# Patient Record
Sex: FEMALE
[De-identification: names, ages, dates, MRNs, and addresses within clinical notes are randomized per-mention and may not be internally consistent; named-entity substitution may affect disease eponyms.]

---

## 2023-01-23 ENCOUNTER — HOSPITAL ENCOUNTER (EMERGENCY)
Dept: HOSPITAL 97 - ER | Age: 58
Discharge: HOME | End: 2023-01-23
Payer: COMMERCIAL

## 2023-01-23 VITALS — DIASTOLIC BLOOD PRESSURE: 79 MMHG | SYSTOLIC BLOOD PRESSURE: 137 MMHG | OXYGEN SATURATION: 99 % | TEMPERATURE: 97.5 F

## 2023-01-23 DIAGNOSIS — S93.401A: Primary | ICD-10-CM

## 2023-01-23 PROCEDURE — 99282 EMERGENCY DEPT VISIT SF MDM: CPT

## 2023-01-23 NOTE — RAD REPORT
EXAM DESCRIPTION:  RAD - Ankle Right 3 View - 1/23/2023 12:13 pm

 

CLINICAL HISTORY:  PAIN

 

COMPARISON:  No comparisons

 

FINDINGS:  Moderate soft tissue swelling affects the ankle. Small calcaneal spurs are present. No acu
te fracture or dislocation.

## 2023-01-23 NOTE — EDPHYS
Physician Documentation                                                                           

 [facility]                                                                 

Name: Natalia Godinez                                                                              

Age: 57 yrs                                                                                       

Sex: Female                                                                                       

: 1965                                                                                   

MRN: [id number]                                                                                   

Arrival Date: 2023                                                                          

Time: 10:39                                                                                       

Account#: [id number]                                                                            

Bed 12                                                                                            

Private MD:                                                                                       

ED Physician Donnie Fraga                                                                         

HPI:                                                                                              

                                                                                             

11:10 This 57 yrs old  Female presents to ER via Wheelchair with complaints of Ankle  ms3 

      Injury.                                                                                     

11:10 57-year-old female with past medical history of hypertension, rheumatoid arthritis      ms3 

      presents for 6/10, throbbing, right ankle pain that began after twisting her ankle when     

      getting out of her truck yesterday. Patient states pain is worse with walking. Patient      

      denies alleviating factors..                                                                

                                                                                                  

OB/GYN:                                                                                           

10:57 LMP N/A - Hysterectomy                                                                  ss  

                                                                                                  

Historical:                                                                                       

- Allergies:                                                                                      

10:57 valsartan;                                                                              ss  

10:57 Lipitor;                                                                                ss  

- Home Meds:                                                                                      

10:57 Vitamin D [Active]; CPAP at night [Active]; rosuvastatin 20 mg oral cpSP 1 cap once     ss  

      daily [Active]; metformin 500 mg Oral Tb24 1 tab once daily [Active]; amlodipine 5 mg       

      tab 1 tab once daily [Active]; spironolactone 50 mg Oral tab 1 tab once daily [Active];     

      metoprolol succinate 100 mg oral Tb24 1 tab once daily [Active]; hydrochlorothiazide 25     

      mg Oral tab 1 tab once daily [Active]; sulfasalazine 500 mg Oral tab 1 tab twice a day      

      [Active]; losartan 50 mg oral tab 1 tab once daily [Active]; Methotrexate                   

      (Anti-Rheumatic) 2.5 mg Oral tab 1 tab once wkly [Active];                                  

- PMHx:                                                                                           

10:57 Hypertensive disorder; pre diabetic;                                                    ss  

- PSHx:                                                                                           

10:57 L shoulder repair;  section; hysterectomy;                                      ss  

                                                                                                  

- Immunization history:: Client reports receiving the 2nd dose of the Covid vaccine.              

- Social history:: Smoking status: Patient denies any tobacco usage or history of.                

                                                                                                  

                                                                                                  

ROS:                                                                                              

11:10 Constitutional: Negative for fever, and chills. Neck: Negative for injury, pain, and    ms3 

      swelling, Cardiovascular: Negative for chest pain, and palpitations. Respiratory:           

      Negative for shortness of breath, cough, wheezing, and pleuritic chest pain,                

      Abdomen/GI: Negative for abdominal pain, nausea, vomiting, diarrhea, and constipation.      

11:10 MS/extremity: Positive for injury or acute deformity, tenderness.                           

11:10 All other systems are negative.                                                             

                                                                                                  

Exam:                                                                                             

11:10 Constitutional:  This is a well developed, well nourished patient who is awake, alert,  ms3 

      and in no acute distress. Head/Face:  Normocephalic, atraumatic. Chest/axilla:  Normal      

      chest wall appearance and motion.  Nontender with no deformity.   Cardiovascular:           

      Regular rate and rhythm with a normal S1 and S2.  No gallops, murmurs, or rubs.  Normal     

      PMI, no JVD.  No pulse deficits. Respiratory:  Lungs have equal breath sounds               

      bilaterally, clear to auscultation and percussion.  No rales, rhonchi or wheezes noted.     

       No increased work of breathing, no retractions or nasal flaring. Abdomen/GI:  Soft,        

      non-tender, with normal bowel sounds.  No distension or tympany.  No guarding or            

      rebound.  No evidence of tenderness throughout.                                             

11:10 Skin:  Warm, dry with normal turgor.  Normal color with no rashes, no lesions, and no       

      evidence of cellulitis.                                                                     

11:10 Musculoskeletal/extremity: Extremities: noted in the Right ankle pain: pain, swelling,      

      tenderness.                                                                                 

                                                                                                  

Vital Signs:                                                                                      

10:56  / 79; Pulse 94; Resp 16; Temp 97.5(TE); Pulse Ox 99% on R/A; Weight 111.13 kg;   ss  

      Height 5 ft. 3 in. (160.02 cm); Pain 6/10;                                                  

10:56 Body Mass Index 43.40 (111.13 kg, 160.02 cm)                                              

                                                                                                  

MDM:                                                                                              

11:07 Patient medically screened.                                                             ms3 

11:10 Differential diagnosis: fracture, sprain, Contusion.                                    ms3 

19:24 Data reviewed: vital signs, nurses notes, radiologic studies, plain films, and as a     ms3 

      result, I will discharge patient. Consideration of Admission/Observation Escalation of      

      care including admission/observation considered. No emergent medical condition              

      necessitating admission found at this time. Independent interpretation of the following     

      test(s) in the Emergency Department X-Ray: My interpretation is My preliminary viewing      

      of x-ray did not reveal fracture. Historians other than the Patient: Spouse/Significant     

      Other: Patient's . Counseling: I had a detailed discussion with the patient          

      and/or guardian regarding: the historical points, exam findings, and any diagnostic         

      results supporting the discharge/admit diagnosis, radiology results, the need for           

      outpatient follow up, to return to the emergency department if symptoms worsen or           

      persist or if there are any questions or concerns that arise at home. ED course:            

      Discussed x-ray final report with patient and her . They understand and agree        

      with plan. Patient to follow-up with her orthopedist in 2 to 3 days. Discussed with the     

      patient and her  if pain is persistent in 1 week patient may need additional         

      imaging. Patient and her  understand agree with plan. Return precautions             

      discussed to include worsening symptoms, or any other concerns..                            

                                                                                                  

                                                                                             

11:08 Order name: Ankle Right 3 View XRAY; Complete Time: 12:52                               ms3 

                                                                                                  

Administered Medications:                                                                         

No medications were administered                                                                  

                                                                                                  

                                                                                                  

Disposition Summary:                                                                              

23 12:56                                                                                    

Discharge Ordered                                                                                 

      Location: Home                                                                          ms3 

      Condition: Stable                                                                       ms3 

      Diagnosis                                                                                   

        - Pain in right ankle and joints of right foot                                        ms3 

        - Sprain of unspecified ligament of right ankle                                       ms3 

      Followup:                                                                               ms3 

        - With: Clem Rooney MD                                                              

        - When: 2 - 3 days                                                                         

        - Reason: Recheck today's complaints                                                       

      Followup:                                                                               ms3 

        - With: Private Physician                                                                  

        - When: 2 - 3 days                                                                         

        - Reason: Recheck today's complaints                                                       

      Discharge Instructions:                                                                     

        - Discharge Summary Sheet                                                             ms3 

        - Musculoskeletal Pain                                                                ms3 

      Forms:                                                                                      

        - Medication Reconciliation Form                                                      ms3 

        - Thank You Letter                                                                    ms3 

        - Antibiotic Education                                                                ms3 

        - Prescription Opioid Use                                                             ms3 

Signatures:                                                                                       

Dispatcher [employer]                           Bertha Hernandez RN RN ss Sims, Marcus, DO                        DO   ms3                                                  

                                                                                                  

Corrections: (The following items were deleted from the chart)                                    

: 10:57 Allergies: No Known Allergies; [facility]  

: 10:57 Allergies: No Known Allergies; [facility]  

: 10:57 Home Meds: None; [facility]  

: 10:57 PMHx: None; [facility]  

: 10:57 PMHx: CPAP; [facility]  

: 10:57 PMHx: Vitamin D; [facility]  

11: 10:57 PSHx: None; [facility]  

11: 10:57 PSHx:  section; [facility]  

                                                                                                  

**************************************************************************************************

## 2023-01-23 NOTE — ER
Nurse's Notes                                                                                     

 [facility]                                                                 

Name: Natalia Godinez                                                                              

Age: 57 yrs                                                                                       

Sex: Female                                                                                       

: 1965                                                                                   

MRN: [id number]                                                                                   

Arrival Date: 2023                                                                          

Time: 10:39                                                                                       

Account#: [id number]                                                                            

Bed 12                                                                                            

Private MD:                                                                                       

Diagnosis: Pain in right ankle and joints of right foot;Sprain of unspecified ligament of right   

  ankle                                                                                           

                                                                                                  

Presentation:                                                                                     

                                                                                             

10:56 Chief complaint: Patient states: R ankle pain after rolling ankle while getting out of    

      truck yesterday. Coronavirus screen: Client denies travel out of the [place] in the last       

      14 days. Ebola Screen: Patient denies exposure to infectious person. Patient denies         

      travel to an Ebola-affected area in the 21 days before illness onset. Initial Sepsis        

      Screen: Does the patient meet any 2 criteria? No. Patient's initial sepsis screen is        

      negative. Does the patient have a suspected source of infection? No. Patient's initial      

      sepsis screen is negative. Risk Assessment: Do you want to hurt yourself or someone         

      else? Patient reports no desire to harm self or others. Onset of symptoms was 2023.                                                                                   

10:56 Method Of Arrival: Wheelchair                                                           ss  

10:56 Acuity: TABATHA 4                                                                           ss  

                                                                                                  

OB/GYN:                                                                                           

10:57 LMP N/A - Hysterectomy                                                                  ss  

                                                                                                  

Historical:                                                                                       

- Allergies:                                                                                      

10:57 valsartan;                                                                              ss  

10:57 Lipitor;                                                                                ss  

- Home Meds:                                                                                      

10:57 Vitamin D [Active]; CPAP at night [Active]; rosuvastatin 20 mg oral cpSP 1 cap once     ss  

      daily [Active]; metformin 500 mg Oral Tb24 1 tab once daily [Active]; amlodipine 5 mg       

      tab 1 tab once daily [Active]; spironolactone 50 mg Oral tab 1 tab once daily [Active];     

      metoprolol succinate 100 mg oral Tb24 1 tab once daily [Active]; hydrochlorothiazide 25     

      mg Oral tab 1 tab once daily [Active]; sulfasalazine 500 mg Oral tab 1 tab twice a day      

      [Active]; losartan 50 mg oral tab 1 tab once daily [Active]; Methotrexate                   

      (Anti-Rheumatic) 2.5 mg Oral tab 1 tab once wkly [Active];                                  

- PMHx:                                                                                           

10:57 Hypertensive disorder; pre diabetic;                                                    ss  

- PSHx:                                                                                           

10:57 L shoulder repair;  section; hysterectomy;                                      ss  

                                                                                                  

- Immunization history:: Client reports receiving the 2nd dose of the Covid vaccine.              

- Social history:: Smoking status: Patient denies any tobacco usage or history of.                

                                                                                                  

                                                                                                  

Assessment:                                                                                       

11:17 General: Appears in no apparent distress. comfortable, Behavior is calm, cooperative.   ss  

      Pain: Complains of pain in R ankle L shoulder Pain currently is 6 out of 10 on a pain       

      scale. Quality of pain is described as aching, tender, Pain began Is continuous.            

                                                                                                  

Vital Signs:                                                                                      

10:56  / 79; Pulse 94; Resp 16; Temp 97.5(TE); Pulse Ox 99% on R/A; Weight 111.13 kg;   ss  

      Height 5 ft. 3 in. (160.02 cm); Pain 6/10;                                                  

10:56 Body Mass Index 43.40 (111.13 kg, 160.02 cm)                                              

                                                                                                  

ED Course:                                                                                        

10:39 Patient arrived in ED.                                                                  mr  

10:57 Triage completed.                                                                       ss  

10:57 Arm band placed on right wrist.                                                         ss  

10:58 Donnie Fraga DO is Attending Physician.                                                ms3 

11:04 Yanni Padgett FNP-C is PHCP.                                                          snw 

11:16 Bertha Coats, RN is Primary Nurse.                                                    ss  

12:15 Ankle Right 3 View XRAY In Process Unspecified.                                         EDMS

12:55 Clem Rooney MD is Referral Physician.                                            ms3 

12:55 Referral Physician role handed off by Clem Rooney MD                             ms3 

                                                                                                  

Administered Medications:                                                                         

No medications were administered                                                                  

                                                                                                  

                                                                                                  

Outcome:                                                                                          

12:56 Discharge ordered by MD.                                                                ms3 

13:13 Patient left the ED.                                                                    iw  

                                                                                                  

Signatures:                                                                                       

Dispatcher [employer]                           EDMS                                                 

Yanni Padgett FNP-C                   FNP-Csn                                                  

KhrisCaron                                                                                    

Kristin Vail RN                     RN                                                      

Bertha Coats, DINORA                      RN                                                      

Donnie Fraga DO                        DO   ms3                                                  

                                                                                                  

Corrections: (The following items were deleted from the chart)                                    

11:02 10:57 Allergies: No Known Allergies; [facility]  

11: 10:57 Allergies: No Known Allergies; [facility]  

11: 10:57 Home Meds: None; [facility]  

11: 10:57 PMHx: None; [facility]  

11: 10:57 PMHx: CPAP; [facility]  

11: 10:57 PMHx: Vitamin D; [facility]  

11: 10:57 PSHx: None; ss                                                                    ss  

11:02 10:57 PSHx:  section; ss                                                        ss  

                                                                                                  

**************************************************************************************************

## 2023-01-23 NOTE — XMS REPORT
Continuity of Care Document

                           Created on:2023



Patient:TANK GODINEZ

Sex:Female

:1965

External Reference #:[id number]





Demographics







                          Address                   [address] [place] [address]

 

                          Home Phone                [phone]

 

                          Work Phone                2022

 

                          Mobile Phone              2022

 

                          Email Address             [email or web address]

 

                          Preferred Language        es

 

                          Marital Status            Unknown

 

                          Amish Affiliation     Unknown

 

                          Race                      Unknown

 

                          Additional Race(s)         or 



                                                    Unavailable



                                                    Unavailable

 

                          Ethnic Group               or 









Author







                          Organization              [facility]

t

 

                          Address                   [address] [place] [address] [address]



                                                    [place] [address]

 

                          Phone                     [phone]









Support







                Name            Relationship    Address         Phone

 

                Cee Godinez Spouse          [address]     [phone]

3



                                                [place] [address] 

 

                NO, CONTACT     Unavailable     .               Unavailable



                                                .               

 

                TANK GODINEZ Unavailable     [address] [phone]



                                                [place] [address] 

 

                CEE GODINEZ SPOU            [address] [facility]   [phone]



                                                [place] [address] 

 

                MAGY GODINEZEN SO              [address] [facility] [address]   [phone]



                                                [place] [address] 









Care Team Providers







                    Name                Role                Phone

 

                    Jovan Haddad     Primary Care Physician [phone]

 

                    MARK MANN     Attending Clinician Unavailable

 

                    Bui_Q               Attending Clinician Unavailable

 

                    BRADEN MARQUEZ Attending Clinician Unavailable

 

                    [facility]   Attending Clinician Unavailable

 

                    Yared Mariee MD Attending Clinician [phone]

 

                    YARED MARIEE Attending Clinician Unavailable

 

                    Doctor Unassigned, No Name Attending Clinician Unavailable

 

                    Jovan Haddad        Attending Clinician Unavailable

 

                    Shani Wills Attending Clinician Unavailable

 

                    Boo_Q               Admitting Clinician Unavailable

 

                    Jovan Haddad        Admitting Clinician Unavailable

 

                    Aditya Rodriguez Admitting Clinician Unavailable









Payers







           Payer Name Policy Type Policy Number Effective Date Expiration Date S

mary

 

           [employer]            [id number] 2022            



           HMO/PLUS                         00:00:00              

 

           [place]            [id number] 2022 



           ADVANTAGE (HMO)                       00:00:00   00:00:00   







Problems







       Condition Condition Condition Status Onset  Resolution Last   Treating Co

mments 

Source



       Name   Details Category        Date   Date   Treatment Clinician        



                                                 Date                 

 

       Immunodefi Immunodefi Problem Active 2022                             V

illage



       ciency ciency               2-24                               Family



       disorder Disorder               00:00:                             Practi

c



                                   00                                 e

 

       Nontraumat Nontraumat Disease Active 2022                      Last   U

T



       ic     ic                                           [facility]



       complete complete               00:00:                      t & Plan: 



       tear of tear of               00                          Formattin 



       rotator rotator                                           g of this 



       cuff, left cuff, left                                           note   



                                                               might be 



                                                               different 



                                                               from the 



                                                               original. 



                                                               The    



                                                               sutures 



                                                               were   



                                                               removed. 



                                                               Incision 



                                                               was    



                                                               clean, 



                                                               dry and 



                                                               intact. 



                                                               Benzoin 



                                                               was    



                                                               applied 



                                                               and    



                                                               Steri-Str 



                                                               ips were 



                                                               applied 



                                                               over the 



                                                               incision. 



                                                               . Ms. Godinez 



                                                               was    



                                                               educated 



                                                               on the 



                                                               signs of 



                                                               infection 



                                                               including 



                                                               increased 



                                                               pain,  



                                                               erythema, 



                                                               tendernes 



                                                               s, and 



                                                               drainage. 



                                                               Ms. Godinez 



                                                               verbalize 



                                                               d      



                                                               understan 



                                                               ding and 



                                                               will   



                                                               notify us 



                                                               immediate 



                                                               ly if any 



                                                               of the 



                                                               symptoms 



                                                               are    



                                                               noticed 

 

       Dyslipidem Dyslipidem Problem Active                              V

illage



       ia     ia                   6-30                               Family



                                   00:00:                             Practic



                                   00                                 e

 

       Obstructiv Obstructiv Problem Active                              V

illage



       e sleep e Sleep               4-04                               Family



       apnea  Apnea                00:00:                             Practic



       syndrome Syndrome               00                                 e

 

       Vitamin D Vitamin D Problem Active                              Shawna

dayami



       deficiency Deficiency               1-25                               Fa

moses



                                   00:00:                             Practic



                                   00                                 e

 

       Diabetes Diabetes Problem Active                              [place]

ge



       mellitus Mellitus               1-                               Family



                                   00:00:                             Practic



                                   00                                 e

 

       Hypertensi Hypertensi Problem Active                              V

illage



       ve     ve                   1-21                               Family



       disorder Disorder               00:00:                             Practi

c



                                   00                                 e

 

       Carotid Carotid Problem Active                              [place]



       artery Artery               -                               Family



       stenosis Stenosis               00:00:                             Practi

c



                                   00                                 e

 

       Rheumatoid Rheumatoid Problem Active                              V

illage



       arthritis Arthritis               -21                               Fami

ly



                                   00:00:                             Practic



                                   00                                 e







Allergies, Adverse Reactions, Alerts







       Allergy Allergy Status Severity Reaction(s) Onset  Inactive Treating Comm

ents 

Source



       Name   Type                        Date   Date   Clinician        

 

       Valsarta Allergy Active        Swelling 2022                      [facility]



       n      to                          08                        [facility]



              substanc                      00:00:                      



              e                           00                          

 

       atorvast DA     Active U                                   [facility]



       atin                                                       [facility]



                                          00:00:                      d



                                          00                          [facility]



                                                                      [facility]

 

       atorvast DA     Active U      SWELLING                       [facility]



       atin                                                       [facility]



                                          00:00:                      d



                                          00                          Medical



                                                                      Center

 

       Atorvast Allergy Active        Swelling                       [facility]



       atin   to                                                  Health



              substanc                      00:00:                      



              e                           00                          

 

       Lipitor Allergy Active Moderate Swelling                             Vill

age



              to                                                      Family



              substanc                                                  Practic



              e                                                       e

 

       NO KNOWN Drug   Active                                           [facility]



       ALLERGIE Class                                                   ity of



       S                                                              [facility]







Social History







           Social Habit Start Date Stop Date  Quantity   Comments   Source

 

           Exposure to 2022 Not sure              [facility]



           SARS-CoV-2 (event) 00:00:00   08:34:00                         

 

           Alcohol intake 2022 Lifetime              [facility]



                      00:00:00   00:00:00   non-drinker            



                                            (finding)             

 

           Tobacco use and 2022 Smokeless tobacco            [facility]



           exposure   00:00:00   00:00:00   non-user              

 

           Sex Assigned At 1965                       [facility]



           Birth      00:00:00   00:00:00                         









                Smoking Status  Start Date      Stop Date       Source

 

                Tobacco smoking consumption                                 [facility]

 

                Never smoked tobacco                                 [facility]







Medications







       Ordered Filled Start  Stop   Current Ordering Indication Dosage Frequency

 Signature

                    Comments            Components          Source



     Medication Medication Date Date Medication? Clinician                (SIG) 

          



     Name Name                                                   

 

     cephalexin      2022      Yes       [id number]           BEGIN THE      

     [facility]



     (Keflex)      2-           DAY AFTER           [facility]

h



     500 MG      00:00:                               SX, 1           



     capsule      00                                 CAPSULE PO           



                                                  EVERY 6           



                                                  HOURS           



                                                  UNTIL           



                                                  COMPLETE           

 

     inFLIXimab      2022      Yes                      Infuse           [facility]



     (Remicade)                                     into a           Health



     100 MG      13:26:                               venous           



     injection      38                                 catheter.           

 

     rosuvastati      2022      Yes                      rosuvastat           

[facility]



     n (Crestor)      08                               in 20 mg           [facility]

th



     20 MG      13:14:                               tablet           



     tablet      30                                 TAKE ONE           



                                                  (1)            



                                                  TABLET(S)           



                                                  BY MOUTH           



                                                  AT             



                                                  BEDTIME.           

 

     metFORMIN      2022      Yes                      metformin           [facility]



     (Glucophage      -08                               500 mg           Health



     ) 500 MG      13:14:                               tablet           



     tablet      30                                 TAKE ONE           



                                                  (1)            



                                                  TABLET(S)           



                                                  BY MOUTH           



                                                  ONCE A           



                                                  DAY.           

 

     amLODIPine      2022      Yes                      amlodipine           U

T



     (Norvasc) 5      -08                               5 mg           Health



     MG tablet      13:14:                               tablet           



               30                                 TAKE ONE           



                                                  (1)            



                                                  TABLET(S)           



                                                  BY MOUTH           



                                                  AT             



                                                  BEDTIME.           

 

     sulfaSALAzi      2022      Yes                      sulfasalaz           

[facility]



     ne        08                               ine 500 mg           Health



     (Azulfidine      13:14:                               tablet           



     ) 500 MG      30                                 TAKE ONE           



     tablet                                         (1)            



                                                  TABLET(S)           



                                                  BY MOUTH           



                                                  ONCE A DAY           

 

     metoprolol      2022      Yes                      metoprolol           U

T



     succinate                                     succinate           Healt

h



     XL        13:14:                                mg           



     (Toprol-XL)      30                                 tablet,ext           



     100 MG 24                                         ended           



     hr tablet                                         release 24           



                                                  hr TAKE           



                                                  ONE (1)           



                                                  TABLET(S)           



                                                  BY MOUTH           



                                                  TWICE A           



                                                  DAY.           

 

     hydroCHLORO      2022      Yes                      hydrochlor           

[facility]



     thiazide                                     othiazide           Health



     (HYDRODiuri      13:14:                               25 mg           



     l) 25 MG      30                                 tablet           



     tablet                                         TAKE ONE           



                                                  (1)            



                                                  TABLET(S)           



                                                  BY MOUTH           



                                                  ONCE A           



                                                  DAY.           

 

     spironolact      2022      Yes                      spironolac           

[facility]



     one       08                               tone 50 mg           Health



     (Aldactone)      13:14:                               tablet           



     50 MG      30                                 TAKE ONE           



     tablet                                         (1)            



                                                  TABLET(S)           



                                                  BY MOUTH           



                                                  ONCE A           



                                                  DAY.           

 

     gabapentin      2022      Yes                      gabapentin           U

T



     (Neurontin)                                     300 mg           Health



     300 MG      13:14:                               capsule           



     capsule      30                                 TAKE ONE           



                                                  (1)            



                                                  CAPSULE BY           



                                                  MOUTH AT           



                                                  BEDTIME.           

 

     losartan      2022      Yes                      losartan           [facility]



     (Cozaar) 50                                     50 mg           Health



     MG tablet      13:14:                               tablet           



               30                                 TAKE ONE           



                                                  (1)            



                                                  TABLET(S)           



                                                  BY MOUTH           



                                                  ONCE A           



                                                  DAY.           

 

     cyclobenzap      2022      Yes                      cyclobenza           

[facility]



     rine      08                               prine 5 mg           Health



     (Flexeril)      13:14:                               tablet           



     5 MG tablet      30                                                

 

     methotrexat            Yes                      INJECT 0.5           

[facility]



     e 50 MG/2ML      7-11                               MLS UNDER           Hea

lth



     injection      00:00:                               THE SKIN           



               00                                 ONCE A           



                                                  WEEK.           

 

     amlodipine amlodipine           No                       amlodipine        

   [facility]



     5 mg tablet 5 mg tablet                                    5 mg           F

amily



     TAKE ONE TAKE ONE                                    tablet           Pract

ic



     (1)  (1)                                     TAKE ONE           e



     TABLET(S) TABLET(S)                                    (1)            



     BY MOUTH BY MOUTH                                    TABLET(S)           



     ONCE A DAY ONCE A DAY                                    BY MOUTH          

 



     AT BEDTIME. AT BEDTIME.                                    ONCE A DAY      

     



                                                  AT             



                                                  BEDTIME.           

 

     cholecalcif cholecalcif           No                       cholecalci      

     [facility]           Family



     (vitamin (vitamin                                    (vitamin           Pra

ctic



     D3) 1,250 D3) 1,250                                    D3) 1,250           

e



     mcg (50,000 mcg (50,000                                    mcg            



     unit) unit)                                    (50,000           



     capsule capsule                                    unit)           



     TAKE ONE TAKE ONE                                    capsule           



     (1)  (1)                                     TAKE ONE           



     CAPSULE(S) CAPSULE(S)                                    (1)            



     BY MOUTH BY MOUTH                                    CAPSULE(S)           



     ONCE A ONCE A                                    BY MOUTH           



     WEEK. WEEK.                                    ONCE A           



                                                  WEEK.           

 

     hydrochloro hydrochloro           No                       hydrochlor      

     [facility]



     thiazide 25 thiazide 25                                    othiazide       

    Family



     mg tablet mg tablet                                    25 mg           Prac

tic



     TAKE ONE TAKE ONE                                    tablet           e



     (1)  (1)                                     TAKE ONE           



     TABLET(S) TABLET(S)                                    (1)            



     BY MOUTH BY MOUTH                                    TABLET(S)           



     ONCE A DAY. ONCE A DAY.                                    BY MOUTH        

   



                                                  ONCE A           



                                                  DAY.           

 

     losartan 50 losartan 50           No                       losartan        

   [facility]



     mg tablet mg tablet                                    50 mg           [facility]

ly



     TAKE ONE TAKE ONE                                    tablet           Pract

ic



     (1)  (1)                                     TAKE ONE           e



     TABLET(S) TABLET(S)                                    (1)            



     BY MOUTH BY MOUTH                                    TABLET(S)           



     ONCE A DAY. ONCE A DAY.                                    BY MOUTH        

   



                                                  ONCE A           



                                                  DAY.           

 

     metformin metformin                                  metformin           

[facility]



     500 mg 500 mg                                    500 mg           Family



     tablet TAKE tablet TAKE                                    tablet          

 Practic



     ONE (1) ONE (1)                                    TAKE ONE           e



     TABLET(S) TABLET(S)                                    (1)            



     BY MOUTH BY MOUTH                                    TABLET(S)           



     ONCE A DAY. ONCE A DAY.                                    BY MOUTH        

   



                                                  ONCE A           



                                                  DAY.           

 

     metoprolol metoprolol                                  metoprolol        

   [facility]



     succinate succinate                                    succinate           

Family



      mg  mg                                     mg           

Pract[facility]



     tablet,exte tablet,exte                                    tablet,ext      

     e



     nded nded                                    ended           



     release 24 release 24                                    release 24        

   



     hr TAKE ONE hr TAKE ONE                                    hr TAKE         

  



     (1)  (1)                                     ONE (1)           



     TABLET(S) TABLET(S)                                    TABLET(S)           



     BY MOUTH BY MOUTH                                    BY MOUTH           



     TWICE A TWICE A                                    TWICE A           



     DAY. DAY.                                    DAY.           

 

     Remicade Remicade           No                       Remicade           [facility]

[facility]



                                                                 Family



                                                                 Practic



                                                                 e

 

     rosuvastati rosuvastati           No                       rosuvastat      

     [facility]



     n 20 mg n 20 mg                                    in 20 mg           Famil

y



     tablet TAKE tablet TAKE                                    tablet          

 Pract[facility]



     ONE (1) ONE (1)                                    TAKE ONE           e



     TABLET(S) TABLET(S)                                    (1)            



     BY MOUTH AT BY MOUTH AT                                    TABLET(S)       

    



     BEDTIME. BEDTIME.                                    BY MOUTH           



                                                  AT             



                                                  BEDTIME.           

 

     spironolact spironolact           No                       spironolac      

     [facility]



     one 50 mg one 50 mg                                    tone 50 mg          

 Family



     tablet TAKE tablet TAKE                                    tablet          

 Pract[facility]



     ONE (1) ONE (1)                                    TAKE ONE           e



     TABLET(S) TABLET(S)                                    (1)            



     BY MOUTH BY MOUTH                                    TABLET(S)           



     ONCE A DAY. ONCE A DAY.                                    BY MOUTH        

   



                                                  ONCE A           



                                                  DAY.           

 

     sulfasalazi sulfasalazi           No                       sulfasalaz      

     [facility]



     ne 500 mg ne 500 mg                                    ine 500 mg          

 Family



     tablet TAKE tablet TAKE                                    tablet          

 Pract[facility]



     ONE (1) ONE (1)                                    TAKE ONE           e



     TABLET(S) TABLET(S)                                    (1)            



     BY MOUTH BY MOUTH                                    TABLET(S)           



     TWICE A TWICE A                                    BY MOUTH           



     DAY. DAY.                                    TWICE A           



                                                  DAY.           

 

     amlodipine amlodipine           No                       amlodipine        

   [facility]



     5 mg tablet 5 mg tablet                                    5 mg           F

amily



     TAKE ONE TAKE ONE                                    tablet           Pract

ic



     (1)  (1)                                     TAKE ONE           e



     TABLET(S) TABLET(S)                                    (1)            



     BY MOUTH AT BY MOUTH AT                                    TABLET(S)       

    



     BEDTIME. BEDTIME.                                    BY MOUTH           



                                                  AT             



                                                  BEDTIME.           

 

     cholecalcif cholecalcif           No                       cholecalci      

     [facility]



     mars marsshayne oliveros           Family



     (vitamin (vitamin                                    (vitamin           Pra

ctic



     D3) 1,250 D3) 1,250                                    D3) 1,250           

e



     mcg (50,000 mcg (50,000                                    mcg            



     unit) unit)                                    (50,000           



     capsule capsule                                    unit)           



     TAKE ONE TAKE ONE                                    capsule           



     (1)  (1)                                     TAKE ONE           



     CAPSULE(S) CAPSULE(S)                                    (1)            



     BY MOUTH BY MOUTH                                    CAPSULE(S)           



     ONCE A ONCE A                                    BY MOUTH           



     WEEK. WEEK.                                    ONCE A           



                                                  WEEK.           

 

     cyclobenzap cyclobenzap           No                       cyclobenza      

     [facility]



     rine 5 mg rine 5 mg                                    prine 5 mg          

 Family



     tablet tablet                                    tablet           Practic



                                                                 e

 

     Folbee Plus Folbee Plus           No                       Folbee          

 [facility]



     5 mg tablet 5 mg tablet                                    Plus 5 mg       

    Family



     TAKE ONE TAKE ONE                                    tablet           Pract

ic



     (1)  (1)                                     TAKE ONE           e



     TABLET(S) TABLET(S)                                    (1)            



     BY MOUTH BY MOUTH                                    TABLET(S)           



     ONCE A DAY. ONCE A DAY.                                    BY MOUTH        

   



                                                  ONCE A           



                                                  DAY.           

 

     hydrochloro hydrochloro           No                       hydrochlor      

     [facility]



     thiazide 25 thiazide 25                                    othiazide       

    Family



     mg tablet mg tablet                                    25 mg           Prac

tic



     TAKE ONE TAKE ONE                                    tablet           e



     (1)  (1)                                     TAKE ONE           



     TABLET(S) TABLET(S)                                    (1)            



     BY MOUTH BY MOUTH                                    TABLET(S)           



     ONCE A DAY. ONCE A DAY.                                    BY MOUTH        

   



                                                  ONCE A           



                                                  DAY.           

 

     losartan 50 losartan 50           No                       losartan        

   [facility]



     mg tablet mg tablet                                    50 mg           Fami

ly



     TAKE ONE TAKE ONE                                    tablet           Pract

ic



     (1)  (1)                                     TAKE ONE           e



     TABLET(S) TABLET(S)                                    (1)            



     BY MOUTH BY MOUTH                                    TABLET(S)           



     ONCE A DAY. ONCE A DAY.                                    BY MOUTH        

   



                                                  ONCE A           



                                                  DAY.           

 

     metformin metformin           No                       metformin           

[facility]



     500 mg 500 mg                                    500 mg           Family



     tablet TAKE tablet TAKE                                    tablet          

 Pract[facility]



     ONE (1) ONE (1)                                    TAKE ONE           e



     TABLET(S) TABLET(S)                                    (1)            



     BY MOUTH BY MOUTH                                    TABLET(S)           



     ONCE A DAY. ONCE A DAY.                                    BY MOUTH        

   



                                                  ONCE A           



                                                  DAY.           

 

     methotrexat methotrexat                                  methotrexa      

     [facility]



     e sodium 25 e sodium 25                                    te sodium       

    Family



     mg/mL mg/mL                                    25 mg/mL           [facility]



     injection injection                                    injection           

e



     solution solution                                    solution           



     INJECT 0.5 INJECT 0.5                                    INJECT 0.5        

   



     MLS UNDER MLS UNDER                                    MLS UNDER           



     THE SKIN THE SKIN                                    THE SKIN           



     ONCE A ONCE A                                    ONCE A           



     WEEK. WEEK.                                    WEEK.           

 

     metoprolol metoprolol                                  metoprolol        

   [facility]



     succinate succinate                                    succinate           

Family



      mg  mg                                     mg           

[facility]



     tablet,exte tablet,exte                                    tablet,ext      

     e



     nded nded                                    ended           



     release 24 release 24                                    release 24        

   



     hr TAKE ONE hr TAKE ONE                                    hr TAKE         

  



     (1)  (1)                                     ONE (1)           



     TABLET(S) TABLET(S)                                    TABLET(S)           



     BY MOUTH BY MOUTH                                    BY MOUTH           



     TWICE A TWICE A                                    TWICE A           



     DAY. DAY.                                    DAY.           

 

     Remicade Remicade           No                       Remicade           [facility]

[facility]



                                                                 Family



                                                                 Pract[facility]



                                                                 e

 

     [facility]



     n 20 mg n 20 mg                                    in 20 mg           Famil

y



     tablet TAKE tablet TAKE                                    tablet          

 Practic



     ONE (1) ONE (1)                                    TAKE ONE           e



     TABLET(S) TABLET(S)                                    (1)            



     BY MOUTH AT BY MOUTH AT                                    TABLET(S)       

    



     BEDTIME. BEDTIME.                                    BY MOUTH           



                                                  AT             



                                                  BEDTIME.           

 

     spironolact spironolact                                  spironolac      

     [facility]



     one 50 mg one 50 mg                                    tone 50 mg          

 Family



     tablet TAKE tablet TAKE                                    tablet          

 Pract[facility]



     ONE (1) ONE (1)                                    TAKE ONE           e



     TABLET(S) TABLET(S)                                    (1)            



     BY MOUTH BY MOUTH                                    TABLET(S)           



     ONCE A DAY. ONCE A DAY.                                    BY MOUTH        

   



                                                  ONCE A           



                                                  DAY.           

 

     sulfasalazi sulfasalazi                                  sulfasalaz      

     [facility]



     ne 500 mg ne 500 mg                                    ine 500 mg          

 Family



     tablet TAKE tablet TAKE                                    tablet          

 Pract[facility]



     ONE (1) ONE (1)                                    TAKE ONE           e



     TABLET(S) TABLET(S)                                    (1)            



     BY MOUTH BY MOUTH                                    TABLET(S)           



     ONCE A DAY ONCE A DAY                                    BY MOUTH          

 



                                                  ONCE A DAY           

 

     Sutab Sutab           No                       Sutab           [facility]



     [phone]                                    [phone]      

     Family



     -0.225 gram -0.225 gram                                    8-0.225         

  [facility]



     tablet TAKE tablet TAKE                                    gram           e



     AS DIRECTED AS DIRECTED                                    tablet          

 



     BY   BY                                      TAKE AS           



     PHYSICIAN PHYSICIAN                                    DIRECTED           



                                                  BY             



                                                  PHYSICIAN           

 

     amlodipine amlodipine           No                       amlodipine        

   [facility]



     5 mg tablet 5 mg tablet                                    5 mg           F

amily



     TAKE ONE TAKE ONE                                    tablet           Pract

ic



     (1)  (1)                                     TAKE ONE           e



     TABLET(S) TABLET(S)                                    (1)            



     BY MOUTH AT BY MOUTH AT                                    TABLET(S)       

    



     BEDTIME. BEDTIME.                                    BY MOUTH           



                                                  AT             



                                                  BEDTIME.           

 

     benzonatate benzonatate           No             1capsul TID  benzonatat   

        [facility]



     200 mg 200 mg                          e(s)      e 200 mg           Family



     capsule capsule                                    capsule           Practi

c



     Take 1 Take 1                                    Take 1           e



     capsule 3 capsule 3                                    capsule 3           



     times a day times a day                                    times a         

  



     by oral by oral                                    day by           



     route as route as                                    oral route           



     needed for needed for                                    as needed         

  



     10 days. 10 days.                                    for 10           



                                                  days.           

 

     cholecalcif cholecalcif           No                       cholecalci      

     [facility]



     mars mars                                    ferol           Family



     (vitamin (vitamin                                    (vitamin           Pra

ctic



     D3) 1,250 D3) 1,250                                    D3) 1,250           

e



     mcg (50,000 mcg (50,000                                    mcg            



     unit) unit)                                    (50,000           



     capsule capsule                                    unit)           



     TAKE ONE TAKE ONE                                    capsule           



     (1)  (1)                                     TAKE ONE           



     CAPSULE(S) CAPSULE(S)                                    (1)            



     BY MOUTH BY MOUTH                                    CAPSULE(S)           



     ONCE A ONCE A                                    BY MOUTH           



     WEEK. WEEK.                                    ONCE A           



                                                  WEEK.           

 

     cyclobenzap cyclobenzap           No                       cyclobenza      

     [facility]



     rine 5 mg rine 5 mg                                    prine 5 mg          

 Family



     tablet TAKE tablet TAKE                                    tablet          

 Pract[facility]



     ONE (1) ONE (1)                                    TAKE ONE           e



     TABLET BY TABLET BY                                    (1) TABLET          

 



     MOUTH 3 MOUTH 3                                    BY MOUTH 3           



     TIMES PER TIMES PER                                    TIMES PER           



     DAY AS DAY AS                                    DAY AS           



     NEEDED. NEEDED.                                    NEEDED.           

 

     Folbee Plus Folbee Plus           No                       Folbee          

 [facility]



     5 mg tablet 5 mg tablet                                    Plus 5 mg       

    Family



     TAKE ONE TAKE ONE                                    tablet           Pract

ic



     (1)  (1)                                     TAKE ONE           e



     TABLET(S) TABLET(S)                                    (1)            



     BY MOUTH BY MOUTH                                    TABLET(S)           



     ONCE A DAY. ONCE A DAY.                                    BY MOUTH        

   



                                                  ONCE A           



                                                  DAY.           

 

     hydrochloro hydrochloro           No                       hydrochlor      

     [facility]



     thiazide 25 thiazide 25                                    othiazide       

    Family



     mg tablet mg tablet                                    25 mg           Prac

tic



     TAKE ONE TAKE ONE                                    tablet           e



     (1)  (1)                                     TAKE ONE           



     TABLET(S) TABLET(S)                                    (1)            



     BY MOUTH BY MOUTH                                    TABLET(S)           



     ONCE A DAY. ONCE A DAY.                                    BY MOUTH        

   



                                                  ONCE A           



                                                  DAY.           

 

     losartan 50 losartan 50           No                       losartan        

   [facility]



     mg tablet mg tablet                                    50 mg           Fami

ly



     TAKE ONE TAKE ONE                                    tablet           Pract

ic



     (1)  (1)                                     TAKE ONE           e



     TABLET(S) TABLET(S)                                    (1)            



     BY MOUTH BY MOUTH                                    TABLET(S)           



     ONCE A DAY. ONCE A DAY.                                    BY MOUTH        

   



                                                  ONCE A           



                                                  DAY.           

 

     metformin metformin                                  metformin           

[facility]



     500 mg 500 mg                                    500 mg           Family



     tablet TAKE tablet TAKE                                    tablet          

 [facility]



     ONE (1) ONE (1)                                    TAKE ONE           e



     TABLET(S) TABLET(S)                                    (1)            



     BY MOUTH BY MOUTH                                    TABLET(S)           



     ONCE A DAY. ONCE A DAY.                                    BY MOUTH        

   



                                                  ONCE A           



                                                  DAY.           

 

     methotrexat methotrexat           No                       methotrexa      

     [facility]



     e sodium e sodium                                    te sodium           [facility]

[facility]



     (PF) 25 (PF) 25                                    (PF) 25           Practi

c



     mg/mL mg/mL                                    mg/mL           e



     injection injection                                    injection           



     solution solution                                    solution           



     INJECT 0.5 INJECT 0.5                                    INJECT 0.5        

   



     ML(S) ML(S)                                    ML(S)           



     SUBCUTANEOU SUBCUTANEOU                                    SUBCUTANEO      

     



     S ONCE A S ONCE A                                    US ONCE A           



     WEEK. WEEK.                                    WEEK.           

 

     [facility]



     e sodium 25 e sodium 25                                    te sodium       

    Family



     mg/mL mg/mL                                    25 mg/mL           [facility]



     injection injection                                    injection           

e



     solution solution                                    solution           



     INJECT 0.5 INJECT 0.5                                    INJECT 0.5        

   



     MLS UNDER MLS UNDER                                    MLS UNDER           



     THE SKIN THE SKIN                                    THE SKIN           



     ONCE A ONCE A                                    ONCE A           



     WEEK. WEEK.                                    WEEK.           

 

     metoprolol metoprolol                                  metoprolol        

   [facility]



     succinate succinate                                    succinate           

Family



      mg  mg                                     mg           

[facility]



     tablet,exte tablet,exte                                    tablet,ext      

     e



     nded nded                                    ended           



     release 24 release 24                                    release 24        

   



     hr TAKE ONE hr TAKE ONE                                    hr TAKE         

  



     (1)  (1)                                     ONE (1)           



     TABLET(S) TABLET(S)                                    TABLET(S)           



     BY MOUTH BY MOUTH                                    BY MOUTH           



     TWICE A TWICE A                                    TWICE A           



     DAY. DAY.                                    DAY.           

 

     Remicade Remicade           No                       Remicade           [facility]

dayami



                                                                 Family



                                                                 [facility]



                                                                 e

 

     rosuvastati rosuvastati           No                       rosuvastat      

     [facility]



     n 20 mg n 20 mg                                    in 20 mg           Famil

y



     tablet TAKE tablet TAKE                                    tablet          

 [facility]



     ONE (1) ONE (1)                                    TAKE ONE           e



     TABLET(S) TABLET(S)                                    (1)            



     BY MOUTH AT BY MOUTH AT                                    TABLET(S)       

    



     BEDTIME. BEDTIME.                                    BY MOUTH           



                                                  AT             



                                                  BEDTIME.           

 

     spironolact spironolact           No                       spironolac      

     [facility]



     one 50 mg one 50 mg                                    tone 50 mg          

 Family



     tablet TAKE tablet TAKE                                    tablet          

 [facility]



     ONE (1) ONE (1)                                    TAKE ONE           e



     TABLET(S) TABLET(S)                                    (1)            



     BY MOUTH BY MOUTH                                    TABLET(S)           



     ONCE A DAY. ONCE A DAY.                                    BY MOUTH        

   



                                                  ONCE A           



                                                  DAY.           

 

     sulfasalazi sulfasalazi           No                       sulfasalaz      

     [facility]



     ne 500 mg ne 500 mg                                    ine 500 mg          

 Family



     tablet TAKE tablet TAKE                                    tablet          

 [facility]



     ONE (1) ONE (1)                                    TAKE ONE           e



     TABLET(S) TABLET(S)                                    (1)            



     BY MOUTH BY MOUTH                                    TABLET(S)           



     ONCE A DAY. ONCE A DAY.                                    BY MOUTH        

   



                                                  ONCE A           



                                                  DAY.           

 

     Sutab Sutab           No                       Sutab           [facility]



     [phone]                                    [phone]      

     Family



     -0.225 gram -0.225 gram                                    8-0.225         

  [facility]



     tablet TAKE tablet TAKE                                    gram           e



     AS DIRECTED AS DIRECTED                                    tablet          

 



     BY   BY                                      TAKE AS           



     PHYSICIAN PHYSICIAN                                    DIRECTED           



                                                  BY             



                                                  PHYSICIAN           

 

     Tamiflu 75 Tamiflu 75           No             1capsul BID  Tamiflu 75     

      [facility]



     mg capsule mg capsule                          e(s)      mg capsule        

   Family



     Take 1 Take 1                                    Take 1           [facility]



     capsule capsule                                    capsule           e



     twice a day twice a day                                    twice a         

  



     by oral by oral                                    day by           



     route for 5 route for 5                                    oral route      

     



     days. days.                                    for 5           



                                                  days.           

 

     tramadol 50 tramadol 50           No                       tramadol        

   [facility]



     mg tablet mg tablet                                    50 mg           Fami

ly



     TAKE ONE TAKE ONE                                    tablet           Pract

ic



     (1)  (1)                                     TAKE ONE           e



     TABLET(S) TABLET(S)                                    (1)            



     BY MOUTH BY MOUTH                                    TABLET(S)           



     TWICE A DAY TWICE A DAY                                    BY MOUTH        

   



     AS NEEDED AS NEEDED                                    TWICE A           



     FOR PAIN. FOR PAIN.                                    DAY AS           



                                                  NEEDED FOR           



                                                  PAIN.           

 

     amlodipine amlodipine           No                       amlodipine        

   [facility]



     5 mg tablet 5 mg tablet                                    5 mg           F

amily



     TAKE ONE TAKE ONE                                    tablet           Pract

ic



     (1)  (1)                                     TAKE ONE           e



     TABLET(S) TABLET(S)                                    (1)            



     BY MOUTH AT BY MOUTH AT                                    TABLET(S)       

    



     BEDTIME. BEDTIME.                                    BY MOUTH           



                                                  AT             



                                                  BEDTIME.           

 

     cholecalcif cholecalcif           No                       cholecalci      

     [facility]



     masr mars                                    ferol           Family



     (vitamin (vitamin                                    (vitamin           Pra

ctic



     D3) 1,250 D3) 1,250                                    D3) 1,250           

e



     mcg (50,000 mcg (50,000                                    mcg            



     unit) unit)                                    (50,000           



     capsule capsule                                    unit)           



     TAKE ONE TAKE ONE                                    capsule           



     (1)  (1)                                     TAKE ONE           



     CAPSULE(S) CAPSULE(S)                                    (1)            



     BY MOUTH BY MOUTH                                    CAPSULE(S)           



     ONCE A ONCE A                                    BY MOUTH           



     WEEK. WEEK.                                    ONCE A           



                                                  WEEK.           

 

     cyclobenzap cyclobenzap           No                       cyclobenza      

     [facility]



     rine 5 mg rine 5 mg                                    prine 5 mg          

 Family



     tablet TAKE tablet TAKE                                    tablet          

 [facility]



     ONE (1) ONE (1)                                    TAKE ONE           e



     TABLET BY TABLET BY                                    (1) TABLET          

 



     MOUTH 3 MOUTH 3                                    BY MOUTH 3           



     TIMES PER TIMES PER                                    TIMES PER           



     DAY AS DAY AS                                    DAY AS           



     NEEDED. NEEDED.                                    NEEDED.           

 

     Folbee Plus Folbee Plus           No                       Folbee          

 [facility]



     5 mg tablet 5 mg tablet                                    Plus 5 mg       

    Family



     TAKE ONE TAKE ONE                                    tablet           Pract

ic



     (1)  (1)                                     TAKE ONE           e



     TABLET(S) TABLET(S)                                    (1)            



     BY MOUTH BY MOUTH                                    TABLET(S)           



     ONCE A DAY. ONCE A DAY.                                    BY MOUTH        

   



                                                  ONCE A           



                                                  DAY.           

 

     hydrochloro hydrochloro           No                       hydrochlor      

     [facility]



     thiazide 25 thiazide 25                                    othiazide       

    Family



     mg tablet mg tablet                                    25 mg           Prac

tic



     TAKE ONE TAKE ONE                                    tablet           e



     (1)  (1)                                     TAKE ONE           



     TABLET(S) TABLET(S)                                    (1)            



     BY MOUTH BY MOUTH                                    TABLET(S)           



     ONCE A DAY. ONCE A DAY.                                    BY MOUTH        

   



                                                  ONCE A           



                                                  DAY.           

 

     hydrocodone hydrocodone           No                       hydrocodon      

     [facility]



     5    5                                       e 5            Family



     mg-acetamin mg-acetamin                                    mg-acetami      

     [facility]



     ophen 325 ophen 325                                    nophen 325          

 e



     mg tablet mg tablet                                    mg tablet           



     TAKE ONE TAKE ONE                                    TAKE ONE           



     (1) OR TWO (1) OR TWO                                    (1) OR TWO        

   



     (2)  (2)                                     (2)            



     TABLET(S) TABLET(S)                                    TABLET(S)           



     BY MOUTH BY MOUTH                                    BY MOUTH           



     EVERY SIX EVERY SIX                                    EVERY SIX           



     HOURS IF HOURS IF                                    HOURS IF           



     NEEDED FOR NEEDED FOR                                    NEEDED FOR        

   



     SEVERE PAIN SEVERE PAIN                                    SEVERE          

 



     FOR UP TO 7 FOR UP TO 7                                    PAIN FOR        

   



     DAYS. DAYS.                                    UP TO 7           



                                                  DAYS.           

 

     losartan 50 losartan 50           No                       losartan        

   [facility]



     mg tablet mg tablet                                    50 mg           Fami

ly



     TAKE ONE TAKE ONE                                    tablet           Pract

ic



     (1)  (1)                                     TAKE ONE           e



     TABLET(S) TABLET(S)                                    (1)            



     BY MOUTH BY MOUTH                                    TABLET(S)           



     ONCE A DAY. ONCE A DAY.                                    BY MOUTH        

   



                                                  ONCE A           



                                                  DAY.           

 

     metformin metformin           No                       metformin           

[facility]



     500 mg 500 mg                                    500 mg           Family



     tablet TAKE tablet TAKE                                    tablet          

 [facility]



     ONE (1) ONE (1)                                    TAKE ONE           e



     TABLET(S) TABLET(S)                                    (1)            



     BY MOUTH BY MOUTH                                    TABLET(S)           



     ONCE A DAY. ONCE A DAY.                                    BY MOUTH        

   



                                                  ONCE A           



                                                  DAY.           

 

     methotrexat methotrexat                                  methotrexa      

     [facility]



     e sodium e sodium                                    te sodium           Fa

moses



     (PF) 25 (PF) 25                                    (PF) 25           Pract[facility]

c



     mg/mL mg/mL                                    mg/mL           e



     injection injection                                    injection           



     solution solution                                    solution           



     INJECT 0.5 INJECT 0.5                                    INJECT 0.5        

   



     ML(S) ML(S)                                    ML(S)           



     SUBCUTANEOU SUBCUTANEOU                                    SUBCUTANEO      

     



     S ONCE A S ONCE A                                    US ONCE A           



     WEEK. WEEK.                                    WEEK.           

 

     metoprolol metoprolol                                  metoprolol        

   [facility]



     succinate succinate                                    succinate           

Family



      mg  mg                                     mg           

[facility]



     tablet,exte tablet,exte                                    tablet,ext      

     e



     nded nded                                    ended           



     release 24 release 24                                    release 24        

   



     hr TAKE ONE hr TAKE ONE                                    hr TAKE         

  



     (1)  (1)                                     ONE (1)           



     TABLET(S) TABLET(S)                                    TABLET(S)           



     BY MOUTH BY MOUTH                                    BY MOUTH           



     ONCE A DAY. ONCE A DAY.                                    ONCE A          

 



                                                  DAY.           

 

     rosuvastati rosuvastati           No             1    Q1D  rosuvastat      

     [facility]



     n 20 mg n 20 mg                                    in 20 mg           Famil

y



     tablet Take tablet Take                                    tablet          

 [facility]



     1 tablet 1 tablet                                    Take 1           e



     every day every day                                    tablet           



     by oral by oral                                    every day           



     route for route for                                    by oral           



     90 days. 90 days.                                    route for           



                                                  90 days.           

 

     spironolact spironolact                                  spironolac      

     [facility]



     one 50 mg one 50 mg                                    tone 50 mg          

 Family



     tablet TAKE tablet TAKE                                    tablet          

 [facility]



     ONE (1) ONE (1)                                    TAKE ONE           e



     TABLET(S) TABLET(S)                                    (1)            



     BY MOUTH BY MOUTH                                    TABLET(S)           



     ONCE A DAY. ONCE A DAY.                                    BY MOUTH        

   



                                                  ONCE A           



                                                  DAY.           

 

     sulfasalazi sulfasalazi                                  sulfasalaz      

     [facility]



     ne 500 mg ne 500 mg                                    ine 500 mg          

 Family



     tablet TAKE tablet TAKE                                    tablet          

 [facility]



     ONE (1) ONE (1)                                    TAKE ONE           e



     TABLET(S) TABLET(S)                                    (1)            



     BY MOUTH BY MOUTH                                    TABLET(S)           



     ONCE A DAY. ONCE A DAY.                                    BY MOUTH        

   



                                                  ONCE A           



                                                  DAY.           

 

     tramadol 50 tramadol 50                                  tramadol        

   [facility]



     mg tablet mg tablet                                    50 mg           Fami

ly



     TAKE ONE TAKE ONE                                    tablet           Pract

ic



     (1)  (1)                                     TAKE ONE           e



     TABLET(S) TABLET(S)                                    (1)            



     BY MOUTH BY MOUTH                                    TABLET(S)           



     TWICE A DAY TWICE A DAY                                    BY MOUTH        

   



     AS NEEDED AS NEEDED                                    TWICE A           



     FOR PAIN. FOR PAIN.                                    DAY AS           



                                                  NEEDED FOR           



                                                  PAIN.           







Immunizations







           Ordered Immunization Filled Immunization Date       Status     Commen

ts   Source



           Name       Name                                        

 

           Pneumococcal Pneumococcal 2022 Completed             [facility]



           conjugate PCV20, conjugate PCV20, 10:35:00                         Pr

actice



           polysaccharide [id number] polysaccharide [id number]                          

        



           conjugate, adjuvant, conjugate, adjuvant,                            

      



           PF         PF                                          

 

           Pneumococcal Pneumococcal 2022 Completed             [facility]



           conjugate PCV20, conjugate PCV20, 10:35:00                         Pr

actice



           polysaccharide [id number] polysaccharide [id number]                          

        



           conjugate, adjuvant, conjugate, adjuvant,                            

      



           PF         PF                                          

 

           Pneumococcal Pneumococcal 2022 Completed             [facility]



           conjugate PCV20, conjugate PCV20, 10:35:00                         Pr

actice



           polysaccharide [id number] polysaccharide [id number]                          

        



           conjugate, adjuvant, conjugate, adjuvant,                            

      



           PF         PF                                          

 

           zoster recombinant zoster recombinant 2022 Completed           

  [facility]



                                 10:26:00                         Practice

 

           zoster recombinant zoster recombinant 2022 Completed           

  [facility]



                                 10:26:00                         Practice

 

           zoster recombinant zoster recombinant 2022 Completed           

  [facility]



                                 10:26:00                         Practice

 

           Tdap       Tdap       2022 Completed             [facility]



                                 16:47:00                         Practice

 

           Tdap       Tdap       2022 Completed             [facility]



                                 16:47:00                         Practice

 

           Tdap       Tdap       2022 Completed             [facility]



                                 16:47:00                         Practice

 

           Tdap       Tdap       2022 Completed             [facility]



                                 16:47:00                         Practice

 

           zoster recombinant zoster recombinant 2022 Completed           

  [facility]



                                 16:44:00                         Practice

 

           zoster recombinant zoster recombinant 2022 Completed           

  [facility]



                                 16:44:00                         Practice

 

           zoster recombinant zoster recombinant 2022 Completed           

  [facility]



                                 16:44:00                         Practice

 

           zoster recombinant zoster recombinant 2022 Completed           

  [facility]



                                 16:44:00                         Practice

 

           COVID-19, mRNA, COVID-19, mRNA, 2021 Completed             [facility]

[facility]



           LNP-S, PF, 100 LNP-S, PF, 100 00:00:00                         Practi

ce



           mcg/0.5 mL dose mcg/0.5 mL dose                                  



           (Moderna)  (Moderna)                                   

 

           COVID-19, mRNA, COVID-19, mRNA, 2021 Completed             [facility]



           LNP-S, PF, 100 LNP-S, PF, 100 00:00:00                         Practi

ce



           mcg/0.5 mL dose mcg/0.5 mL dose                                  



           (Moderna)  (Moderna)                                   

 

           COVID-19, mRNA, COVID-19, mRNA, 2021 Completed             [facility]

[facility]



           LNP-S, PF, 100 LNP-S, PF, 100 00:00:00                         Practi

ce



           mcg/0.5 mL dose mcg/0.5 mL dose                                  



           (Moderna)  (Moderna)                                   

 

           COVID-19, mRNA, COVID-19, mRNA, 2021 Completed             Vill

[facility]



           LNP-S, PF, 100 LNP-S, PF, 100 00:00:00                         Practi

ce



           mcg/0.5 mL dose mcg/0.5 mL dose                                  



           (Moderna)  (Moderna)                                   

 

           COVID-19, mRNA, COVID-19, mRNA, 2021 Completed             [facility]

[facility]



           LNP-S, PF, 100 LNP-S, PF, 100 00:00:00                         Practi

ce



           mcg/0.5 mL dose mcg/0.5 mL dose                                  



           (Moderna)  (Moderna)                                   

 

           COVID-19, mRNA, COVID-19, mRNA, 2021 Completed             [facility]

[facility]



           LNP-S, PF, 100 LNP-S, PF, 100 00:00:00                         Practi

ce



           mcg/0.5 mL dose mcg/0.5 mL dose                                  



           (Moderna)  (Moderna)                                   

 

           COVID-19, mRNA, COVID-19, mRNA, 2021 Completed             [facility]

[facility]



           LNP-S, PF, 100 LNP-S, PF, 100 00:00:00                         Practi

ce



           mcg/0.5 mL dose mcg/0.5 mL dose                                  



           (Moderna)  (Moderna)                                   

 

           COVID-19, mRNA, COVID-19, mRNA, 2021 Completed             [facility]

[facility]



           LNP-S, PF, 100 LNP-S, PF, 100 00:00:00                         Practi

ce



           mcg/0.5 mL dose mcg/0.5 mL dose                                  



           (Moderna)  (Moderna)                                   







Vital Signs







             Vital Name   Observation Time Observation Value Comments     Source

 

             Height       2022 00:00:00 63 [in_i]                 [facility]



                                                                 P[facility]

 

             BMI (Body Mass 2022 00:00:00 43.2 kg/m2                [facility]



             Index)                                              Practice

 

             Body Weight  2022 00:00:00 244 [lb_av]               [facility]



                                                                 [facility]

 

             BP Diastolic 2022 00:00:00 72 mm[Hg]                 [facility]



                                                                 [facility]

 

             Height       2022 00:00:00 63 [in_i]                 [facility]



                                                                 [facility]

 

             BMI (Body Mass 2022 00:00:00 43.2 kg/m2                [facility]

[facility]



             Index)                                              Practice

 

             BP Systolic  2022 00:00:00 131 mm[Hg]                [facility]



                                                                 [facility]

 

             Body Weight  2022 00:00:00 244 [lb_av]               [facility]



                                                                 [facility]

 

             Height       2022 00:00:00 63 [in_i]                 [facility]



                                                                 [facility]

 

             BMI (Body Mass 2022 00:00:00 43.2 kg/m2                [facility]

[facility]



             Index)                                              Practice

 

             Body Weight  2022 00:00:00 244 [lb_av]               [facility]



                                                                 [facility]

 

             BP Diastolic 2022 00:00:00 76 mm[Hg]                 [facility]



                                                                 [facility]

 

             Height       2022 00:00:00 63 [in_i]                 [facility]



                                                                 [facility]

 

             BMI (Body Mass 2022 00:00:00 43.1 kg/m2                [facility]

[facility]



             Index)                                              Practice

 

             BP Systolic  2022 00:00:00 106 mm[Hg]                [facility]



                                                                 [facility]

 

             Body Weight  2022 00:00:00 243.2 [lb_av]              [facility]



                                                                 Practice

 

             BP Diastolic 2022 00:00:00 78 mm[Hg]                 [facility]



                                                                 [facility]

 

             Height       2022 00:00:00 63 [in_i]                 [facility]



                                                                 [facility]

 

             BMI (Body Mass 2022 00:00:00 43.8 kg/m2                [facility]

[facility]



             Index)                                              Practice

 

             BP Systolic  2022 00:00:00 111 mm[Hg]                [facility]



                                                                 [facility]

 

             Body Weight  2022 00:00:00 247 [lb_av]               [facility]



                                                                 [facility]

 

             BP Diastolic 2022 00:00:00 81 mm[Hg]                 [facility]



                                                                 [facility]

 

             Height       2022 00:00:00 63 [in_i]                 [facility]



                                                                 [facility]

 

             BMI (Body Mass 2022 00:00:00 43.6 kg/m2                [facility]

e Family



             Index)                                              Practice

 

             BP Systolic  2022 00:00:00 117 mm[Hg]                [facility]

 

             Body Weight  2022 00:00:00 246 [lb_av]               [facility]







Procedures







                Procedure       Date / Time     Performing Clinician Source



                                Performed                       

 

                ASSIGNMENT OF BENEFITS 2022 18:33:20 Doctor Unassigned, No

 [facility]            [facility]

 

                MAMMO, screening, 2022 00:00:00                 [facility]

moess



                digital, bilateral                                 Practice

 

                MAMMO, screening, 2022 00:00:00                 [facility]

[facility]



                digital, bilateral                                 Practice

 

                 Section                                 [facility]

 

                Partial Hysterectomy                                 [facility]







Plan of Care







             Planned Activity Planned Date Details      Comments     Source

 

             Diagnostic Test Pending 2022   HbA1c (hemoglobin             

 [facility]



                          00:00:00     A1c), blood [code              Practice



                                       = HbA1c                   



                                       (hemoglobin A1c),              



                                       blood]                    

 

             Future Appointment 2023   Jovan Haddad, [id number]              [facility]

 



                          00:00:00     [address]              Practice



                                       [address]; [address],              



                                       [place], [place]              



                                       [address]                

 

             Instructions                                        [facility]







Encounters







        Start   End     Encounter Admission Attending Care    Care    Encounter 

Source



        Date/Time Date/Time Type    Type    Clinicians Facility Department ID   

   

 

        2022         Outpatient                 [facility]     [id number]

 [place]



        07:50:33                                                 [id number] [facility]

 

        2022         Outpatient                 [facility]     [id number]

 [place]



        08:37:37                                                 [id number] [facility]

 

        2022         Outpatient                 [facility]     [id number]

 [place]



        10:17:39                                                 [id number] [facility]

 

        2022         Outpatient                 [facility]     [id number]

 [place]



        14:47:55                                                 [id number] [facility]

 

        2022         Outpatient                 [facility]     [id number]

 [place]



        13:01:07                                                 [id number] [facility]

 

        2022         Outpatient                 [facility]     [id number]

 [place]



        13:00:38                                                 [id number] [facility]

 

        2022         Outpatient                 [facility]     [id number]

 [place]



        08:58:48                                                 [id number] [facility]

 

        2022-10-05         Outpatient                 [facility]     [id number]

 [place]



        10:01:45                                                 [id number] [facility]

 

        2022-10-04         Outpatient                 [facility]     [id number]

 [place]



        13:49:47                                                 [id number] [facility]

 

        2023-02-10 2023-02-10 Outpatient         JOHNATHAN [facility]     [id number]

740 [place]



        09:15:00 09:15:00                 [facility]

 

        2022 Office          [facility] [id number]

7342 [place]



        08:45:00 08:53:22 Visit           Mark KENNY [id number]         H

[facility] [id number]         



                                                PLAZA 1 [phone]         



                                                        7               

 

        2022 Outpatient         Bui_Q   VFP     VFP     [id number]

-20 [facility]



        00:00:00 00:00:00                                         [id number]  Family



                                                                        Practic



                                                                        e

 

        2022 Outpatient         Bui_Q   VFP     VFP     [id number]

 [facility]



        00:00:00 00:00:00                                         [id number]  Family



                                                                        Practic



                                                                        e

 

        2022 Outpatient         Bui_Q   VFP     VFP     [id number]

-20 [facility]



        00:00:00 00:00:00                                         [id number]  Family



                                                                        Practic



                                                                        e

 

        2022 Outpatient         Bui_Q   VFP     VFP     [id number] [facility]



        00:00:00 00:00:00                                         [id number]  Family



                                                                        Practic



                                                                        e

 

        2022 Jovan Mandel                 VFP     [place] -    [id number]

3 [facility]



        00:00:00 00:00:00 MD Boo:                         Village         Famil

y



                        [id number]                           Medical -         Practi

c



                        [place]                          [place] -            e



                        [address]         



                        [address]                            [address]         



                        [address],                                         



                        [place], [place]                                              



                        [address]                                         



                        , Ph.                                           



                        [phone]                                         

 

        2022 Outpatient         JOHNATHAN [facility]     [id number]

008 [place]



        13:00:00 13:00:00                 [facility]

 

        2022 Outpatient         ROBERT [facility]     [id number]

[id number] [place]



        14:00:00 14:00:00                 BRADEN BYRD

h

 

        2022-12-15 2022-12-15 Outpatient         Bui_Q   VFP     VFP     [id number]

-20 [facility]



        00:00:00 00:00:00                                         [id number]  Family



                                                                        Practic



                                                                        e

 

        2022-12-15 2022-12-15 Outpatient         Bui_Q   VFP     VFP     [id number]

-20 [facility]



        00:00:00 00:00:00                                         [id number]  Family



                                                                        Practic



                                                                        e

 

        2022 Outpatient         Bui_Q   VFP     VFP     [id number]

-20 [facility]



        00:00:00 00:00:00                                         [id number]  Family



                                                                        Practic



                                                                        e

 

        2022 Lisa                  VFP     [place] -    [id number] V

illage



        00:00:00 00:00:00 [facility]

mark manuel MD:                         Medical -         Pract

ic



                        [id number]                           [place] -            e



                        [address]                          [address]         



                        [address]                           [address]                                           



                        [address],                                         



                        [place], [place]                                              



                        [address]                                         



                        , Ph.                                           



                        [phone]                                         

 

        2022 [facility]     142

[id number] [place]



        13:30:00 14:26:53                 E, BRADENKAISER Kirkland

h

 

        2022 Outpatient                 [facility]     [id number]

26 [place]



        13:30:00 13:30:00                                                 Health

 

        2022 Outpatient         [facility]     [id number]

[id number] [place]



        09:30:00 09:30:00                 E, BRADEN                         Isael

talita

 

        2022-10-31 2022-10-31 Outpatient         Bui_Q   VFP     VFP     [id number]

- [facility]



        00:00:00 00:00:00                                         [id number]  Family



                                                                        Practic



                                                                        e

 

        2022 Outpatient         Bui_Q   VFP     VFP     [id number]

-20 [facility]



        00:00:00 00:00:00                                         [id number]  Family



                                                                        Practic



                                                                        e

 

        2022 Jovan Mandel                 VFP     [place] -    [id number]

8 [facility]



        00:00:00 00:00:00 MD Boo:                         [facility]         Famil

y



                        [id number]                           Medical -         Practi

c



                        [place]                          [place] -            e



                        [address]                           [address]         



                        [address]                            [address]         



                        [address], [place]                                              



                        [address]                                         



                        , Ph.                                           



                        [phone]                                         

 

        2022 Outpatient         Bui_Q   VFP     VFP     [id number]

-20 [facility]



        00:00:00 00:00:00                                         [id number]  Family



                                                                        Practic



                                                                        e

 

        2022 Technician         Mariela, [facility]    1.2.8

[id number] 

Univers



        13:30:00 13:45:00 Visit           Yared MarieeSHARMILA 350.1.1

3.10         ity of



                                                [place] [id number]         [place]

[place]



                                                PROFESSIO [phone]         Me

dical



                                                [place] [id number]             Branch



                                                [facility]                 

 

        2022 Outpatient R       [facility]    [id number]

[id number] [facility]



        13:30:00 13:30:00                 YARED BYRD



                                                                        [facility]

 

        2022 Orders          Doctor  SHANI    [id number]

 Univers



        00:00:00 00:00:00 Only            Unassigned, JIMY   [id number]       

  ity of



                                        [facility] [id number]         Feliz

as



                                                        [phone]         [address]

 

        2022 Outpatient         Bui_Q   VFP     VFP     [id number] [facility]



        00:00:00 00:00:00                                         [id number]  Family



                                                                        Practic



                                                                        e

 

        2022 Outpatient         Bui_Q   VFP     VFP     2068 [facility]



        00:00:00 00:00:00                                         [id number]  Family



                                                                        Practic



                                                                        e

 

        2022 Outpatient         Bui_Q   VFP     VFP     2068 [facility]



        10:31:00 10:31:00                                         [id number]  Family



                                                                        Practic



                                                                        e

 

        2022 Outpatient         Bui_Q   VFP     VFP     [id number]

-20 [facility]



        03:30:00 03:30:00                                         [id number]  Family



                                                                        Practic



                                                                        e

 

        2022 Outpatient         Bui_Q   VFP     VFP     [id number]

 [facility]



        01:48:00 01:48:00                                         [id number]  Family



                                                                        Practic



                                                                        e

 

        2022 Outpatient         Bui_Q   VFP     VFP     [id number]

 [facility]



        02:44:00 02:44:00                                         [id number]  Family



                                                                        Practic



                                                                        e

 

        2022 Jovan Mandel                 VFP     [place] -    [id number]

0 [facility]



        00:00:00 00:00:00 MD Boo:                         [facility]         Famil

y



                        [id number]                           Medical -         Practi

c



                        [address]                          [address]         e



                        [facility],                                           



                        [address], [place]                                              



                        [address]                                         



                        , Ph.                                           



                        [phone]                                         

 

        2022 Outpatient EL      HaddadJovan HCAPM   OTIS    LA00

[id number] [facility]



        08:00:00 08:00:00                                         62      [facility]

 

        2022 Outpatient EL      Haddad, Jovan HCAPM   OTIS    LA00

[id number] [facility]



        08:00:00 08:00:00                                         06      [facility]

 

        2022 Outpatient         Bui_Q   VFP     VFP     [id number]

-20 [facility]



        03:13:00 03:13:00                                         [id number]  Family



                                                                        Practic



                                                                        e

 

        2022 Outpatient         Bui_Q   VFP     VFP     [id number]

-20 [facility]



        07:50:00 07:50:00                                         [id number]  Family



                                                                        Practic



                                                                        e

 

        2022 Outpatient         Bui_Q   VFP     VFP     [id number]

-20 [facility]



        04:46:00 04:46:00                                         [id number]  Family



                                                                        Practic



                                                                        e

 

        2022 Jovan Mandel                 VFP     [place] -    [id number]

4 [facility]



        00:00:00 00:00:00 MD Boo:                         [facility] System [facility]         Famil

y



                        [id number]                           Medical -         Practi

c



                        Shadow                          VM_HOU_Shad         e



                        [facility],                                           



                        [address], [place]                                              



                        [address]                                         



                        , Ph.                                           



                        [phone]                                         

 

        2022 Outpatient         Bui_Q   VFP     VFP     [id number]

-20 [facility]



        08:43:00 08:43:00                                         [id number]  Family



                                                                        Practic



                                                                        e

 

        2022 Outpatient         Bui_Q   VFP     VFP     [id number]

-20 [facility]



        08:43:00 08:43:00                                         [id number]  Family



                                                                        Practic



                                                                        e

 

        2022 Outpatient         Bui_Q   VFP     VFP     2068 [facility]



        09:22:00 09:22:00                                         [id number]  Family



                                                                        Practic



                                                                        e

 

        2022 Outpatient         Bui_Q   VFP     VFP     2068 [facility]



        03:31:00 03:31:00                                         [id number]  Family



                                                                        Practic



                                                                        e

 

        2022 Outpatient         Bui_Q   VFP     VFP     [id number]

-20 [facility]



        01:20:00 01:20:00                                         [id number]  Family



                                                                        Practic



                                                                        e

 

        2022 Outpatient         Bui_Q   VFP     VFP     [id number]

-20 [facility]



        01:20:00 01:20:00                                         [id number]  Family



                                                                        Practic



                                                                        e

 

        2022 Outpatient         Bui_Q   VFP     VFP     [id number]

- [facility]



        01:20:00 01:20:00                                         [id number]  Family



                                                                        Practic



                                                                        e

 

        2022 Outpatient         Bui_Q   VFP     VFP     [id number]

- [facility]



        12:14:00 12:14:00                                         [id number]  Family



                                                                        Practic



                                                                        e

 

        2022 Outpatient         Bui_Q   VFP     VFP     2068 [facility]



        08:34:00 08:34:00                                         [id number]  Family



                                                                        Practic



                                                                        e

 

        2022 [place]                 VFP     [place] -    2022 V

illage



        00:00:00 00:00:00 [facility]



                        [place],                         Medical -         Prac

tic



                        NP: [address] LUISA_Stanley hurley [facility], [address]                                         



                        [address],                                            



                        [place], [place]                                              



                        [address]                                         



                        , Ph.                                           



                        [phone]                                         

 

        2022 Outpatient         Bui_Q   VFP     VFP     [id number] [facility]



        02:08:00 02:08:00                                         [id number]  Family



                                                                        Practic



                                                                        e

 

        2022 Outpatient         Bui_Q   VFP     VFP     [id number]

- [facility]



        10:52:00 10:52:00                                         [id number]  Family



                                                                        Practic



                                                                        e

 

        2021 Outpatient         Elma Shani [facility]   RADI    [id number]

3-20 [facility]



        10:00:00 10:00:00                                         [id number]  [facility]







Results







           Test Description Test Time  Test Comments Results    Result     [facility]

e



                                                       Comments   

 

           - NM MYOCRD SPECT 2019             FAX: Aditya Castro         

   



           R/S MULT   15:23:00              [facility] [phone]            



                                            Camps: PM St: REG            



                                            FAX: Shani Casillas            



                                            [phone]            



                                            ---------------------            



                                            ---------------------            



                                            ---------------------            



                                            ----------------            



                                            Name: TANK GODINEZ [place] :            



                                            1965 Age/S:            



                                            53/F [address]            



                                            [address] Unit #:            



                                            [id number] Loc: [place] [address]            



                                            Phys: Shani Wills MD Acct:            



                                            [id number] Dis            



                                            Date: Status: REG CLI            



                                             PHONE #:             



                                            [phone] Exam            



                                            Date: 2019 1033            



                                            FAX #: Reason: CHEST            



                                            PAIN UNSPECIFIED            



                                            EXAMS:  CPT:            



                                            [id number] NM MYOCRD            



                                            SPECT R/S MULT [id number]            



                                            SPECT myocardial            



                                            perfusion study with            



                                            gated wall motion and            



                                            ejection fraction            



                                            INDICATION: Chest            



                                            pain LOCATION: T18            



                                            Resting images were            



                                            obtained after            



                                            administration of 10            



                                            mCi Tc-99m sestamibi            



                                            and stress images            



                                            were obtained after            



                                            administration of 30            



                                            mCi Tc-99m sestamibi,            



                                            and 0.4mg Lexiscan.            



                                            PERFUSION STUDY            



                                            Normal distribution            



                                            of radionuclide            



                                            throughout the left            



                                            ventricular wall. No            



                                            evidence of ischemia,            



                                            scar or infarct.            



                                            GATED STUDY Normal            



                                            wall motion , with a            



                                            left ventricular            



                                            ejection fraction of            



                                            62%. IMPRESSION:            



                                            Normal study.            



                                            Ejection fraction            



                                            62%. **               



                                            Electronically Signed            



                                            by YESSICA Ga            



                                            ** ** on 2019            



                                            at 1523 ** Reported            



                                            and signed by: Shani Ga M.D. CC:            



                                            Aditya Rodriguez MD;            



                                            Shani Wills MD            



                                            Technologist: DOMINGO Walker            



                                            Transcribed            



                                            Date/Time/By:            



                                            2019 (1523)            



                                            :Reyna Orig Print            



                                            D/T: S: 2019            



                                            (1520) PAGE 1 Signed            



                                            Report